# Patient Record
Sex: FEMALE | Race: WHITE | Employment: OTHER | URBAN - METROPOLITAN AREA
[De-identification: names, ages, dates, MRNs, and addresses within clinical notes are randomized per-mention and may not be internally consistent; named-entity substitution may affect disease eponyms.]

---

## 2020-04-28 RX ORDER — PANTOPRAZOLE SODIUM 40 MG/1
40 TABLET, DELAYED RELEASE ORAL
COMMUNITY

## 2020-04-28 RX ORDER — ATORVASTATIN CALCIUM 40 MG/1
40 TABLET, FILM COATED ORAL NIGHTLY
COMMUNITY

## 2020-04-28 RX ORDER — POTASSIUM CHLORIDE 20 MEQ/1
20 TABLET, EXTENDED RELEASE ORAL 2 TIMES DAILY
COMMUNITY

## 2020-04-28 RX ORDER — BUMETANIDE 2 MG/1
2 TABLET ORAL 2 TIMES DAILY
COMMUNITY

## 2020-04-28 RX ORDER — METOPROLOL TARTRATE 50 MG/1
50 TABLET, FILM COATED ORAL 2 TIMES DAILY
Status: ON HOLD | COMMUNITY
End: 2020-05-01

## 2020-04-28 RX ORDER — BIOTIN 1 MG
1 TABLET ORAL DAILY
COMMUNITY

## 2020-04-29 NOTE — H&P
[facility] - [facility]    History and Physical    Gardner Prior Patient Status:  Surgery Admit - Inpt    3/12/1932 MRN [id number]   Location [place] [address] Attending Joce Mckeon MD   Hosp Day # 0 PCP No primary care provider on percussion.  She has no tenderness to the thoracic spine.  She has 5/5 strength with leg extension, dorsiflexion, plantar flexion, EHL strength.  Sensation is intact to light touch throughout the bilateral lower extremities.     Results:   No results found

## 2020-04-30 ENCOUNTER — ANESTHESIA (OUTPATIENT)
Dept: SURGERY | Facility: HOSPITAL | Age: 85
End: 2020-04-30
Payer: MEDICARE

## 2020-04-30 ENCOUNTER — HOSPITAL ENCOUNTER (OUTPATIENT)
Facility: HOSPITAL | Age: 85
LOS: 1 days | Discharge: HOME | End: 2020-05-01
Attending: ORTHOPAEDIC SURGERY | Admitting: ORTHOPAEDIC SURGERY
Payer: MEDICARE

## 2020-04-30 ENCOUNTER — ANESTHESIA EVENT (OUTPATIENT)
Dept: SURGERY | Facility: HOSPITAL | Age: 85
End: 2020-04-30
Payer: MEDICARE

## 2020-04-30 ENCOUNTER — APPOINTMENT (OUTPATIENT)
Dept: GENERAL RADIOLOGY | Facility: HOSPITAL | Age: 85
End: 2020-04-30
Attending: ORTHOPAEDIC SURGERY
Payer: MEDICARE

## 2020-04-30 PROBLEM — I50.9 CHRONIC HEART FAILURE (HCC): Chronic | Status: ACTIVE | Noted: 2020-04-30

## 2020-04-30 PROBLEM — S32.000A: Status: ACTIVE | Noted: 2020-04-30

## 2020-04-30 PROBLEM — I10 ESSENTIAL HYPERTENSION: Chronic | Status: ACTIVE | Noted: 2020-04-30

## 2020-04-30 PROBLEM — E78.5 HYPERLIPIDEMIA: Chronic | Status: ACTIVE | Noted: 2020-04-30

## 2020-04-30 PROBLEM — I48.91 ATRIAL FIBRILLATION (HCC): Chronic | Status: ACTIVE | Noted: 2020-04-30

## 2020-04-30 PROCEDURE — 0QU03JZ SUPPLEMENT LUMBAR VERTEBRA WITH SYNTHETIC SUBSTITUTE, PERCUTANEOUS APPROACH: ICD-10-PCS | Performed by: ORTHOPAEDIC SURGERY

## 2020-04-30 PROCEDURE — 76000 FLUOROSCOPY <1 HR PHYS/QHP: CPT | Performed by: ORTHOPAEDIC SURGERY

## 2020-04-30 PROCEDURE — 99204 OFFICE O/P NEW MOD 45 MIN: CPT | Performed by: HOSPITALIST

## 2020-04-30 PROCEDURE — 0QS03ZZ REPOSITION LUMBAR VERTEBRA, PERCUTANEOUS APPROACH: ICD-10-PCS | Performed by: ORTHOPAEDIC SURGERY

## 2020-04-30 DEVICE — KIVA VCF TREATMENT SYSTEM, FISRT FRACTURE KIT, RIGHT
Type: IMPLANTABLE DEVICE | Status: FUNCTIONAL
Brand: KIVA

## 2020-04-30 DEVICE — IMPLANTABLE DEVICE
Type: IMPLANTABLE DEVICE | Status: FUNCTIONAL
Brand: KIT:VCF TREATMENT,LEFT,FRACTURE

## 2020-04-30 RX ORDER — HYDROCODONE BITARTRATE AND ACETAMINOPHEN 5; 325 MG/1; MG/1
1 TABLET ORAL AS NEEDED
Status: DISCONTINUED | OUTPATIENT
Start: 2020-04-30 | End: 2020-04-30 | Stop reason: HOSPADM

## 2020-04-30 RX ORDER — PROCHLORPERAZINE EDISYLATE 5 MG/ML
5 INJECTION INTRAMUSCULAR; INTRAVENOUS ONCE AS NEEDED
Status: DISCONTINUED | OUTPATIENT
Start: 2020-04-30 | End: 2020-04-30 | Stop reason: HOSPADM

## 2020-04-30 RX ORDER — ONDANSETRON 2 MG/ML
4 INJECTION INTRAMUSCULAR; INTRAVENOUS EVERY 4 HOURS PRN
Status: ACTIVE | OUTPATIENT
Start: 2020-04-30 | End: 2020-05-01

## 2020-04-30 RX ORDER — EPHEDRINE SULFATE 50 MG/ML
INJECTION, SOLUTION INTRAVENOUS AS NEEDED
Status: DISCONTINUED | OUTPATIENT
Start: 2020-04-30 | End: 2020-04-30 | Stop reason: SURG

## 2020-04-30 RX ORDER — CYCLOBENZAPRINE HCL 10 MG
5 TABLET ORAL 3 TIMES DAILY PRN
Status: DISCONTINUED | OUTPATIENT
Start: 2020-04-30 | End: 2020-05-01

## 2020-04-30 RX ORDER — SENNOSIDES 8.6 MG
17.2 TABLET ORAL NIGHTLY
Status: DISCONTINUED | OUTPATIENT
Start: 2020-04-30 | End: 2020-05-01

## 2020-04-30 RX ORDER — METOPROLOL TARTRATE 50 MG/1
50 TABLET, FILM COATED ORAL 2 TIMES DAILY
Status: CANCELLED | OUTPATIENT
Start: 2020-04-30

## 2020-04-30 RX ORDER — BUPIVACAINE HYDROCHLORIDE AND EPINEPHRINE 5; 5 MG/ML; UG/ML
INJECTION, SOLUTION PERINEURAL AS NEEDED
Status: DISCONTINUED | OUTPATIENT
Start: 2020-04-30 | End: 2020-04-30 | Stop reason: HOSPADM

## 2020-04-30 RX ORDER — DIPHENHYDRAMINE HCL 25 MG
25 CAPSULE ORAL EVERY 4 HOURS PRN
Status: DISCONTINUED | OUTPATIENT
Start: 2020-04-30 | End: 2020-05-01

## 2020-04-30 RX ORDER — CLINDAMYCIN PHOSPHATE 600 MG/50ML
600 INJECTION INTRAVENOUS ONCE
Status: DISCONTINUED | OUTPATIENT
Start: 2020-04-30 | End: 2020-04-30 | Stop reason: HOSPADM

## 2020-04-30 RX ORDER — POLYETHYLENE GLYCOL 3350 17 G/17G
17 POWDER, FOR SOLUTION ORAL DAILY PRN
Status: DISCONTINUED | OUTPATIENT
Start: 2020-04-30 | End: 2020-05-01

## 2020-04-30 RX ORDER — CLINDAMYCIN PHOSPHATE 150 MG/ML
INJECTION, SOLUTION INTRAVENOUS AS NEEDED
Status: DISCONTINUED | OUTPATIENT
Start: 2020-04-30 | End: 2020-04-30 | Stop reason: SURG

## 2020-04-30 RX ORDER — PANTOPRAZOLE SODIUM 40 MG/1
40 TABLET, DELAYED RELEASE ORAL
Status: DISCONTINUED | OUTPATIENT
Start: 2020-04-30 | End: 2020-05-01

## 2020-04-30 RX ORDER — SODIUM PHOSPHATE, DIBASIC AND SODIUM PHOSPHATE, MONOBASIC 7; 19 G/133ML; G/133ML
1 ENEMA RECTAL ONCE AS NEEDED
Status: DISCONTINUED | OUTPATIENT
Start: 2020-04-30 | End: 2020-05-01

## 2020-04-30 RX ORDER — HYDROMORPHONE HYDROCHLORIDE 1 MG/ML
0.6 INJECTION, SOLUTION INTRAMUSCULAR; INTRAVENOUS; SUBCUTANEOUS EVERY 5 MIN PRN
Status: DISCONTINUED | OUTPATIENT
Start: 2020-04-30 | End: 2020-04-30 | Stop reason: HOSPADM

## 2020-04-30 RX ORDER — NALOXONE HYDROCHLORIDE 0.4 MG/ML
80 INJECTION, SOLUTION INTRAMUSCULAR; INTRAVENOUS; SUBCUTANEOUS AS NEEDED
Status: DISCONTINUED | OUTPATIENT
Start: 2020-04-30 | End: 2020-04-30 | Stop reason: HOSPADM

## 2020-04-30 RX ORDER — ATORVASTATIN CALCIUM 40 MG/1
40 TABLET, FILM COATED ORAL NIGHTLY
Status: DISCONTINUED | OUTPATIENT
Start: 2020-04-30 | End: 2020-05-01

## 2020-04-30 RX ORDER — DOCUSATE SODIUM 100 MG/1
100 CAPSULE, LIQUID FILLED ORAL 2 TIMES DAILY
Status: DISCONTINUED | OUTPATIENT
Start: 2020-04-30 | End: 2020-05-01

## 2020-04-30 RX ORDER — METOCLOPRAMIDE 10 MG/1
10 TABLET ORAL ONCE
Status: DISCONTINUED | OUTPATIENT
Start: 2020-04-30 | End: 2020-04-30 | Stop reason: HOSPADM

## 2020-04-30 RX ORDER — HYDROMORPHONE HYDROCHLORIDE 1 MG/ML
0.2 INJECTION, SOLUTION INTRAMUSCULAR; INTRAVENOUS; SUBCUTANEOUS EVERY 5 MIN PRN
Status: DISCONTINUED | OUTPATIENT
Start: 2020-04-30 | End: 2020-04-30 | Stop reason: HOSPADM

## 2020-04-30 RX ORDER — DIPHENHYDRAMINE HYDROCHLORIDE 50 MG/ML
25 INJECTION INTRAMUSCULAR; INTRAVENOUS EVERY 4 HOURS PRN
Status: DISCONTINUED | OUTPATIENT
Start: 2020-04-30 | End: 2020-05-01

## 2020-04-30 RX ORDER — BISACODYL 10 MG
10 SUPPOSITORY, RECTAL RECTAL
Status: DISCONTINUED | OUTPATIENT
Start: 2020-04-30 | End: 2020-05-01

## 2020-04-30 RX ORDER — HYDROCODONE BITARTRATE AND ACETAMINOPHEN 5; 325 MG/1; MG/1
2 TABLET ORAL AS NEEDED
Status: DISCONTINUED | OUTPATIENT
Start: 2020-04-30 | End: 2020-04-30 | Stop reason: HOSPADM

## 2020-04-30 RX ORDER — FAMOTIDINE 20 MG/1
20 TABLET ORAL ONCE
Status: DISCONTINUED | OUTPATIENT
Start: 2020-04-30 | End: 2020-04-30 | Stop reason: HOSPADM

## 2020-04-30 RX ORDER — ONDANSETRON 2 MG/ML
4 INJECTION INTRAMUSCULAR; INTRAVENOUS ONCE AS NEEDED
Status: DISCONTINUED | OUTPATIENT
Start: 2020-04-30 | End: 2020-04-30 | Stop reason: HOSPADM

## 2020-04-30 RX ORDER — METOCLOPRAMIDE HYDROCHLORIDE 5 MG/ML
10 INJECTION INTRAMUSCULAR; INTRAVENOUS EVERY 6 HOURS PRN
Status: DISCONTINUED | OUTPATIENT
Start: 2020-04-30 | End: 2020-04-30

## 2020-04-30 RX ORDER — SODIUM CHLORIDE, SODIUM LACTATE, POTASSIUM CHLORIDE, CALCIUM CHLORIDE 600; 310; 30; 20 MG/100ML; MG/100ML; MG/100ML; MG/100ML
INJECTION, SOLUTION INTRAVENOUS CONTINUOUS
Status: DISCONTINUED | OUTPATIENT
Start: 2020-04-30 | End: 2020-04-30 | Stop reason: HOSPADM

## 2020-04-30 RX ORDER — MORPHINE SULFATE 4 MG/ML
2 INJECTION, SOLUTION INTRAMUSCULAR; INTRAVENOUS EVERY 10 MIN PRN
Status: DISCONTINUED | OUTPATIENT
Start: 2020-04-30 | End: 2020-04-30 | Stop reason: HOSPADM

## 2020-04-30 RX ORDER — ONDANSETRON 2 MG/ML
INJECTION INTRAMUSCULAR; INTRAVENOUS AS NEEDED
Status: DISCONTINUED | OUTPATIENT
Start: 2020-04-30 | End: 2020-04-30 | Stop reason: SURG

## 2020-04-30 RX ORDER — BUMETANIDE 1 MG/1
2 TABLET ORAL 2 TIMES DAILY
Status: CANCELLED | OUTPATIENT
Start: 2020-04-30

## 2020-04-30 RX ORDER — METOPROLOL TARTRATE 50 MG/1
50 TABLET, FILM COATED ORAL 2 TIMES DAILY
Status: DISCONTINUED | OUTPATIENT
Start: 2020-04-30 | End: 2020-04-30

## 2020-04-30 RX ORDER — HYDROMORPHONE HYDROCHLORIDE 1 MG/ML
0.4 INJECTION, SOLUTION INTRAMUSCULAR; INTRAVENOUS; SUBCUTANEOUS EVERY 5 MIN PRN
Status: DISCONTINUED | OUTPATIENT
Start: 2020-04-30 | End: 2020-04-30 | Stop reason: HOSPADM

## 2020-04-30 RX ORDER — ACETAMINOPHEN 500 MG
1000 TABLET ORAL ONCE
Status: COMPLETED | OUTPATIENT
Start: 2020-04-30 | End: 2020-04-30

## 2020-04-30 RX ORDER — MORPHINE SULFATE 4 MG/ML
4 INJECTION, SOLUTION INTRAMUSCULAR; INTRAVENOUS EVERY 10 MIN PRN
Status: DISCONTINUED | OUTPATIENT
Start: 2020-04-30 | End: 2020-04-30 | Stop reason: HOSPADM

## 2020-04-30 RX ORDER — METOCLOPRAMIDE HYDROCHLORIDE 5 MG/ML
5 INJECTION INTRAMUSCULAR; INTRAVENOUS EVERY 6 HOURS PRN
Status: DISCONTINUED | OUTPATIENT
Start: 2020-04-30 | End: 2020-05-01

## 2020-04-30 RX ORDER — DEXAMETHASONE SODIUM PHOSPHATE 4 MG/ML
VIAL (ML) INJECTION AS NEEDED
Status: DISCONTINUED | OUTPATIENT
Start: 2020-04-30 | End: 2020-04-30 | Stop reason: SURG

## 2020-04-30 RX ORDER — TRAMADOL HYDROCHLORIDE 50 MG/1
50 TABLET ORAL EVERY 6 HOURS PRN
Qty: 30 TABLET | Refills: 0 | Status: SHIPPED | OUTPATIENT
Start: 2020-04-30

## 2020-04-30 RX ORDER — SODIUM CHLORIDE, SODIUM LACTATE, POTASSIUM CHLORIDE, CALCIUM CHLORIDE 600; 310; 30; 20 MG/100ML; MG/100ML; MG/100ML; MG/100ML
INJECTION, SOLUTION INTRAVENOUS CONTINUOUS
Status: DISCONTINUED | OUTPATIENT
Start: 2020-04-30 | End: 2020-04-30

## 2020-04-30 RX ORDER — MORPHINE SULFATE 10 MG/ML
6 INJECTION, SOLUTION INTRAMUSCULAR; INTRAVENOUS EVERY 10 MIN PRN
Status: DISCONTINUED | OUTPATIENT
Start: 2020-04-30 | End: 2020-04-30 | Stop reason: HOSPADM

## 2020-04-30 RX ORDER — POTASSIUM CHLORIDE 20 MEQ/1
20 TABLET, EXTENDED RELEASE ORAL 2 TIMES DAILY
Status: DISCONTINUED | OUTPATIENT
Start: 2020-04-30 | End: 2020-04-30

## 2020-04-30 RX ORDER — HALOPERIDOL 5 MG/ML
0.25 INJECTION INTRAMUSCULAR ONCE AS NEEDED
Status: DISCONTINUED | OUTPATIENT
Start: 2020-04-30 | End: 2020-04-30 | Stop reason: HOSPADM

## 2020-04-30 RX ADMIN — SODIUM CHLORIDE, SODIUM LACTATE, POTASSIUM CHLORIDE, CALCIUM CHLORIDE: 600; 310; 30; 20 INJECTION, SOLUTION INTRAVENOUS at 09:27:00

## 2020-04-30 RX ADMIN — ONDANSETRON 4 MG: 2 INJECTION INTRAMUSCULAR; INTRAVENOUS at 10:02:00

## 2020-04-30 RX ADMIN — CLINDAMYCIN PHOSPHATE 900 MG: 150 INJECTION, SOLUTION INTRAVENOUS at 09:55:00

## 2020-04-30 RX ADMIN — DEXAMETHASONE SODIUM PHOSPHATE 4 MG: 4 MG/ML VIAL (ML) INJECTION at 10:02:00

## 2020-04-30 RX ADMIN — EPHEDRINE SULFATE 5 MG: 50 INJECTION, SOLUTION INTRAVENOUS at 09:54:00

## 2020-04-30 NOTE — CONSULTS
[facility]    Cardiology Consultation    Maritza Beth Patient Status:  Outpatient in a Bed    3/12/1932 MRN [id number]   Location [facility] 4W/SW/SE Attending Dolly Cross MD   Hosp Day # 1 PCP No primary care provider on file Daily  •  Pantoprazole Sodium (PROTONIX) EC tab 40 mg, 40 mg, Oral, BID AC  •  sodium chloride 0.9% IV bolus 500 mL, 500 mL, Intravenous, Once PRN  •  cyclobenzaprine (FLEXERIL) tab 5 mg, 5 mg, Oral, TID PRN  •  Senna (SENOKOT) tab 17.2 mg, 17.2 mg, Oral, rub.  3 out of 6 systolic ejection murmur left lower sternal border. Lungs: Clear without wheezes, rales, rhonchi or dullness. Normal excursions and effort. Abdomen: Soft, non-tender. BS-present. Extremities: Without clubbing, cyanosis or edema.   Per Cardiology [address] [place] [address] Cardiology.   [phone]

## 2020-04-30 NOTE — PROGRESS NOTES
[facility] - [facility]    Progress Note    Hoy Bolds Patient Status:  Inpatient    3/12/1932 MRN [id number]   Location [address] UNIT Attending Ari Kaur MD   Hosp Day # 0 PCP No primary care provider on file. fibrillation (HCC)  KAT WITH OCCASIONAL PAUSES, ASYMPTOMATIC, HOLD CARDIZEM AND METOPROLOL, MONITOR. CARDIOLOGY CONSULT. OBTAIN BMP, MG TSH. Chronic heart failure (HCC)  HOLD BUMEX, MONITOR VOLUOM STATUS.              Results:   No results found f

## 2020-04-30 NOTE — PROGRESS NOTES
spoke with patient. She stated she wanted [facility].  has placed request for FR. Stacey Barrera to visit patient on 5/1 to visit and serve communion.        04/30/20 2350   Clinical Encounter Type   Visited With Patient   Routine Visit In

## 2020-04-30 NOTE — CM/SW NOTE
Patient failed inpatient criteria. Second level of review completed and supports observation. UR committee in agreement. Discussed with Dr. Gregorio Feliciano  who approves observation status. MOON given to the patient and order written.

## 2020-04-30 NOTE — OPERATIVE REPORT
[facility]    PATIENT'S NAME: [facility]   ATTENDING PHYSICIAN: Luisito Matute MD   OPERATING PHYSICIAN: Evan Lopez MD   PATIENT ACCOUNT#:   [id number]    LOCATION:  [facility] PACU 4 [facility] 10  MEDICAL RECORD #:   [id number]       DATE OF B by Dr. Amanuel Banks. Patient was then placed prone on 2 chest rolls, which were on top of a Bam table. Dual fluoroscopy was then brought in the field and confirmed L3 and L4 in both AP and lateral planes.   Her back was then prepped and draped in sterile fa used.  No complication noted. Postoperative instructions were given in both written and oral form to her and her son. She will follow up with Dr. Bria Adrian in 1 week's time in the office. The patient tolerated the procedure well.       Dictated By Avani Harmon.

## 2020-04-30 NOTE — INTERVAL H&P NOTE
Pre-op Diagnosis: L3 and L4 vertebral compression fractures, age related osteoporosis associated with current pathologic fractures of vertebral bodies    The above referenced H&P was reviewed by Elaina Be MD on 4/30/2020, the patient was examined an

## 2020-04-30 NOTE — BRIEF OP NOTE
Pre-Operative Diagnosis: L3 and L4 Acute Vertebral Compression Fracture, Age-related osteoporosis, current pathologic fracture of vertebral body     Post-Operative Diagnosis:  L3 and L4 Acute Vertebral Compression Fracture, Age-related osteoporosis, curren

## 2020-04-30 NOTE — BRIEF OP NOTE
Pre-Operative Diagnosis: 1) and 2) L3 and L4 vertebral compression fractures, 3) age related osteoporosis associated with current pathologic fractures of vertebral bodies     Post-Operative Diagnosis:1) and 2)  L3 and L4 vertebral compression fractures, 3)

## 2020-04-30 NOTE — ANESTHESIA PROCEDURE NOTES
Airway  Date/Time: 4/30/2020 9:50 AM  Urgency: elective    Airway not difficult    General Information and Staff    Patient location during procedure: OR  Anesthesiologist: Eric Whyte MD  Performed: anesthesiologist     Indications and Patient Condition

## 2020-04-30 NOTE — PROGRESS NOTES
[facility] Pharmacy Note:  Renal Dose Adjustment for Metoclopramide (REGLAN)    Harrison Spence has been prescribed Metoclopramide (REGLAN) 10 mg every 6 hours as needed for nausea. Estimated Creatinine Clearance: 16.7 mL/min (A) (based on SCr of 1.67 mg/dL (H)).

## 2020-04-30 NOTE — ANESTHESIA PREPROCEDURE EVALUATION
Anesthesia PreOp Note    HPI:     Andrei Goncalves is a 80year old female who presents for preoperative consultation requested by: Ed Oswald MD    Date of Surgery: 4/30/2020    Procedure(s):  KYPHOPLASTY  Indication: L3 and L4 vertebral compression fra Cholecalciferol (VITAMIN D3) 25 MCG (1000 UT) Oral Cap, Take 1 tablet by mouth daily. , Disp: , Rfl: , 4/29/2020 at Unknown time      lactated ringers infusion, , Intravenous, Continuous, Dominick Hilliard MD, Last Rate: 20 mL/hr at 04/30/20 6364  metoprolo Attends meetings of clubs or organizations: Not on file        Relationship status: Not on file      Intimate partner violence:        Fear of current or ex partner: Not on file        Emotionally abused: Not on file        Physically abused: Not on Risks complications of prone position included not limited blindness      I have informed Harrison Spence and/or legal guardian or family member of the nature of the anesthetic plan, benefits, risks including possible dental damage if relevant, major complicat

## 2020-04-30 NOTE — ANESTHESIA POSTPROCEDURE EVALUATION
Patient: Judah Bermudez    Procedure Summary     Date:  04/30/20 Room / Location:  [address] [place] [address] MAIN OR 09 / [address] [place] [address] MAIN OR    Anesthesia Start:  7886 Anesthesia Stop:      Procedure:  KYPHOPLASTY (N/A Back) Diagnosis:  (L3 and L4 vertebral compression fractures, age rel

## 2020-05-01 ENCOUNTER — PATIENT OUTREACH (OUTPATIENT)
Dept: CASE MANAGEMENT | Age: 85
End: 2020-05-01

## 2020-05-01 VITALS
HEIGHT: 60 IN | SYSTOLIC BLOOD PRESSURE: 133 MMHG | HEART RATE: 82 BPM | TEMPERATURE: 97 F | BODY MASS INDEX: 22.78 KG/M2 | OXYGEN SATURATION: 100 % | RESPIRATION RATE: 18 BRPM | DIASTOLIC BLOOD PRESSURE: 71 MMHG | WEIGHT: 116 LBS

## 2020-05-01 PROCEDURE — 99214 OFFICE O/P EST MOD 30 MIN: CPT | Performed by: HOSPITALIST

## 2020-05-01 RX ORDER — PSEUDOEPHEDRINE HCL 30 MG
100 TABLET ORAL 2 TIMES DAILY PRN
Qty: 12 CAPSULE | Refills: 0 | Status: SHIPPED | OUTPATIENT
Start: 2020-05-01

## 2020-05-01 RX ORDER — CYCLOBENZAPRINE HCL 5 MG
5 TABLET ORAL 3 TIMES DAILY PRN
Qty: 12 TABLET | Refills: 0 | Status: SHIPPED | OUTPATIENT
Start: 2020-05-01

## 2020-05-01 NOTE — PROGRESS NOTES
[facility]    Cardiology Progress Note    Raúl Cormier Patient Status:  Outpatient in a Bed    3/12/1932 MRN [id number]   Location [facility] 4W/SW/SE Attending Richard Verdin Day # 1 PCP No primary care provider ITCHING  Penicillins             RASH  Sulfa Antibiotics       ITCHING    Medications:  atorvastatin (LIPITOR) tab 40 mg, 40 mg, Oral, Nightly  cholecalciferol (VITAMIN D3) cap/tab 1,000 Units, 1,000 Units, Oral, Daily  Pantoprazole Sodium (PRO surgery.     Problem #4 history of CAD  Stable currently.           Will follow with you.       Valentina Lemus MD [facility] Director Non Invasive Cardiology [address] [place] [address] Cardiology.   [phone]

## 2020-05-01 NOTE — PROGRESS NOTES
[facility]    Progress Note    Jaylin Ross Patient Status:  Outpatient in a Bed    3/12/1932 MRN [id number]   Location [facility] 4W/SW/SE Attending Richard Verdin Day # 1 PCP No primary care provider on file. signed on 04/30/2020 at 16:55 by Corinna Dumas MD  5/1/2020

## 2020-05-01 NOTE — PLAN OF CARE
Problem: Patient/Family Goals  Goal: Patient/Family Long Term Goal  Description  Patient's Long Term Goal:     Interventions:  -   - See additional Care Plan goals for specific interventions  Outcome: Progressing  Goal: Patient/Family Short Term Goal  Mary Leonard falls.  - Trosper fall precautions as indicated by assessment.  - Educate pt/family on patient safety including physical limitations  - Instruct pt to call for assistance with activity based on assessment  - Modify environment to reduce risk of injury  - factors for pressure ulcer development  - Assess and document skin integrity  - Assess and document dressing/incision, wound bed, drain sites and surrounding tissue  - Implement wound care per orders  - Initiate isolation precautions as appropriate  - Init

## 2020-05-01 NOTE — PLAN OF CARE
Problem: PAIN - ADULT  Goal: Verbalizes/displays adequate comfort level or patient's stated pain goal  Description  INTERVENTIONS:  - Encourage pt to monitor pain and request assistance  - Assess pain using appropriate pain scale  - Administer analgesics control medications as ordered  - Initiate emergency measures for life threatening arrhythmias  - Monitor electrolytes and administer replacement therapy as ordered  Outcome: Adequate for Discharge    On tele. No calls.      Problem: SKIN/TISSUE INTEGRITY - appropriate  - Assess patient's ability to be responsible for managing their own health  - Refer to Case Management Department for coordinating discharge planning if the patient needs post-hospital services based on physician/LIP order or complex needs rel

## 2020-05-01 NOTE — DISCHARGE SUMMARY
Dc summary#[id number]  >30 min spent on [facility] Discharge Diagnoses: vertebroplasty    Lace+ Score: 54  59-90 High Risk  29-58 Medium Risk  0-28   Low Risk.     TCM Follow-Up Recommendation:  LACE < 29: Low Risk of readmission after discharge from the

## 2020-05-01 NOTE — PROGRESS NOTES
1st attempt [facility] apt request     [facility]   [address] [facility]  [phone]    Gloria Ramírez.  May 7th @10am

## 2020-05-02 NOTE — DISCHARGE SUMMARY
[facility]    PATIENT'S NAME: Imani Lioneric   ATTENDING PHYSICIAN: Reinaldo Verdin MD   PATIENT ACCOUNT#:   [id number]    LOCATION:  [address] [place] [address] RECORD #:   [id number]       YOB: 1932  ADMISSION DATE:       04/30/20 Stable. CODE STATUS:  Full Code, but may need to be addressed and clarified. DIET:  Low salt. ACTIVITY:  Home health, PT, OT. Otherwise, as tolerated.     FOLLOWUP:  Her primary doctor in the [address] [place] [address], to call for followup and advice on how

## 2020-05-04 ENCOUNTER — PATIENT OUTREACH (OUTPATIENT)
Dept: CASE MANAGEMENT | Age: 85
End: 2020-05-04

## 2020-05-07 ENCOUNTER — OFFICE VISIT (OUTPATIENT)
Dept: CARDIOLOGY CLINIC | Facility: HOSPITAL | Age: 85
End: 2020-05-07
Attending: NURSE PRACTITIONER
Payer: MEDICARE

## 2020-05-07 VITALS
SYSTOLIC BLOOD PRESSURE: 127 MMHG | BODY MASS INDEX: 23 KG/M2 | WEIGHT: 119 LBS | HEART RATE: 61 BPM | DIASTOLIC BLOOD PRESSURE: 43 MMHG | OXYGEN SATURATION: 91 %

## 2020-05-07 DIAGNOSIS — I48.91 ATRIAL FIBRILLATION (HCC): Primary | ICD-10-CM

## 2020-05-07 DIAGNOSIS — I48.20 CHRONIC ATRIAL FIBRILLATION (HCC): Chronic | ICD-10-CM

## 2020-05-07 DIAGNOSIS — I50.32 CHRONIC DIASTOLIC HEART FAILURE (HCC): Chronic | ICD-10-CM

## 2020-05-07 PROCEDURE — 99212 OFFICE O/P EST SF 10 MIN: CPT | Performed by: NURSE PRACTITIONER

## 2020-05-07 PROCEDURE — 99214 OFFICE O/P EST MOD 30 MIN: CPT | Performed by: NURSE PRACTITIONER

## 2020-05-07 NOTE — PROGRESS NOTES
[facility] Patient Status:  Outpatient    3/12/1932 MRN [id number]   Location [address] [place] [address] No primary care provider on file.   Dr. Gonzales Calvillo with [facility] is a 80year old female PM       Clinical labs drawn by MA: EKG-results reviewed with patient and family      /43   Pulse 61   Wt 119 lb (54 kg)   SpO2 91%   BMI 23.24 kg/m²     Hospital D/C wt: 116  Clinic weights: 1) 119  Home Wt:     General appearance: alert, appears st directed    Follow up with Dr. Anne Marie Ames next week    Follow up with Cardiologist Dr. Gonzales Calvillo 6/9/20    Please call the [facility] if you notice a weight gain of 3 pounds overnight or 5 pounds or more in 5 days.  Call the clinic if any of the

## 2020-05-07 NOTE — PATIENT INSTRUCTIONS
Continue current medications    Use your incentive spirometer 4 sets of 10 daily     Return to [address] [place] [address] as needed or directed    Follow up with Dr. Navneet Duque next week    Follow up with Cardiologist Dr. Natan Nguyễn 6/9/20    Please call the Sp

## (undated) DEVICE — ENCORE® LATEX ACCLAIM SIZE 7.5, STERILE LATEX POWDER-FREE SURGICAL GLOVE: Brand: ENCORE

## (undated) DEVICE — Device: Brand: KIVA ULTRAFLEX CEMENT NEEDLE KIT

## (undated) DEVICE — COVER SGL STRL LGHT HNDL BLU

## (undated) DEVICE — OCCLUSIVE GAUZE STRIP,3% BISMUTH TRIBROMOPHENATE IN PETROLATUM BLEND: Brand: XEROFORM

## (undated) DEVICE — 3M™ TEGADERM™ TRANSPARENT FILM DRESSING, 1626W, 4 IN X 4-3/4 IN (10 CM X 12 CM), 50 EACH/CARTON, 4 CARTON/CASE: Brand: 3M™ TEGADERM™

## (undated) DEVICE — 3M™ IOBAN™ 2 ANTIMICROBIAL INCISE DRAPE 6650EZ: Brand: IOBAN™ 2

## (undated) DEVICE — SUTURE ETHILON 4-0 1667G

## (undated) DEVICE — BAG SRG CLR 36X30IN

## (undated) DEVICE — SOL  .9 1000ML BTL

## (undated) DEVICE — OUTPATIENT: Brand: MEDLINE INDUSTRIES, INC.

## (undated) DEVICE — GAMMEX® NON-LATEX PI ORTHO SIZE 8, STERILE POLYISOPRENE POWDER-FREE SURGICAL GLOVE: Brand: GAMMEX

## (undated) DEVICE — DRAPE SHEET LG

## (undated) DEVICE — GLV RAD SENSICARE BLACK 8.5

## (undated) DEVICE — DRAPE SHEET LAPAROTOMY